# Patient Record
Sex: FEMALE | Race: WHITE | NOT HISPANIC OR LATINO | Employment: PART TIME | URBAN - METROPOLITAN AREA
[De-identification: names, ages, dates, MRNs, and addresses within clinical notes are randomized per-mention and may not be internally consistent; named-entity substitution may affect disease eponyms.]

---

## 2017-04-11 ENCOUNTER — TELEPHONE (OUTPATIENT)
Dept: ELECTROPHYSIOLOGY | Facility: CLINIC | Age: 32
End: 2017-04-11

## 2017-04-11 DIAGNOSIS — G90.A POTS (POSTURAL ORTHOSTATIC TACHYCARDIA SYNDROME): Primary | ICD-10-CM

## 2017-04-11 NOTE — TELEPHONE ENCOUNTER
----- Message from Kayy Hernandez sent at 4/11/2017  4:33 PM CDT -----  Contact: Pt called  Pt called, states her ankles are swelling and having slight chest pains occassionally. Ph for pt is [phone]. Thank you

## 2017-04-11 NOTE — TELEPHONE ENCOUNTER
"Patient states she is having a slight increase in swelling and mild chest pain over several weeks. Patient instructed to go to the ER. Pt does not feel her symptoms are "that bad" I offered pt an appointment tomorrow here in clinic. Pt will scheduled an appointment with her local cardiologist and keep the scheduled 6mo f/u appointment 04/17/2017 with NITA Rodriguez   "

## 2017-04-17 ENCOUNTER — HOSPITAL ENCOUNTER (OUTPATIENT)
Dept: CARDIOLOGY | Facility: CLINIC | Age: 32
Discharge: HOME | End: 2017-04-17
Payer: COMMERCIAL

## 2017-04-17 ENCOUNTER — TELEPHONE (OUTPATIENT)
Dept: ELECTROPHYSIOLOGY | Facility: CLINIC | Age: 32
End: 2017-04-17

## 2017-04-17 ENCOUNTER — OFFICE VISIT (OUTPATIENT)
Dept: ELECTROPHYSIOLOGY | Facility: CLINIC | Age: 32
End: 2017-04-17
Payer: COMMERCIAL

## 2017-04-17 VITALS
BODY MASS INDEX: 22.92 KG/M2 | SYSTOLIC BLOOD PRESSURE: 100 MMHG | DIASTOLIC BLOOD PRESSURE: 64 MMHG | WEIGHT: 137.56 LBS | HEART RATE: 61 BPM | HEIGHT: 65 IN

## 2017-04-17 DIAGNOSIS — R06.02 SOB (SHORTNESS OF BREATH): ICD-10-CM

## 2017-04-17 DIAGNOSIS — F41.9 ANXIETY: ICD-10-CM

## 2017-04-17 DIAGNOSIS — R07.9 CHEST PAIN, UNSPECIFIED TYPE: ICD-10-CM

## 2017-04-17 DIAGNOSIS — G90.A POTS (POSTURAL ORTHOSTATIC TACHYCARDIA SYNDROME): Primary | ICD-10-CM

## 2017-04-17 DIAGNOSIS — R42 DIZZINESS: ICD-10-CM

## 2017-04-17 DIAGNOSIS — G90.A POTS (POSTURAL ORTHOSTATIC TACHYCARDIA SYNDROME): ICD-10-CM

## 2017-04-17 DIAGNOSIS — R55 PRE-SYNCOPE: ICD-10-CM

## 2017-04-17 DIAGNOSIS — R53.83 FATIGUE, UNSPECIFIED TYPE: ICD-10-CM

## 2017-04-17 DIAGNOSIS — R00.2 HEART PALPITATIONS: ICD-10-CM

## 2017-04-17 DIAGNOSIS — R68.89 DECREASED ACTIVITY TOLERANCE: ICD-10-CM

## 2017-04-17 PROCEDURE — 99214 OFFICE O/P EST MOD 30 MIN: CPT | Mod: S$GLB,,, | Performed by: NURSE PRACTITIONER

## 2017-04-17 PROCEDURE — 93000 ELECTROCARDIOGRAM COMPLETE: CPT | Mod: S$GLB,,, | Performed by: INTERNAL MEDICINE

## 2017-04-17 PROCEDURE — 1160F RVW MEDS BY RX/DR IN RCRD: CPT | Mod: S$GLB,,, | Performed by: NURSE PRACTITIONER

## 2017-04-17 PROCEDURE — 99999 PR PBB SHADOW E&M-EST. PATIENT-LVL III: CPT | Mod: PBBFAC,,, | Performed by: NURSE PRACTITIONER

## 2017-04-17 NOTE — PROGRESS NOTES
Subjective:    Patient ID:  Wendy Rodriguez is a 32 y.o. female who presents for follow-up of POTS.     Ms. Rodriguez is a patient of Dr. Virginia Riley.     HPI     Ms. Rodriguez is a 32 y.o. female with POTS, dizziness, fatigue, palpitations, and anxiety. It her initial office visit, Ms. Rodriguez reported that she began to experience daily palpitations with standing with associated dizziness following her , appendectomy and cholecystectomy. At the time, she noted that her HR would frequently jump from 70s to 150 bpm without a specific trigger. At the time, she denied experiencing an episode of shaina syncope but reported frequent episodes of pre-syncope. Ms. Rodriguez was diagnosed with inappropriate ST at that time ([facility]; Dr. Feliz) and placed on metoprolol. She did well for 2 years with a stable dose of metoprolol but ultimately developed a recurrence of her symptoms; at the time, these were occurring daily, consisting of dizziness, palpitations and fatigue.    She sought medical attention with Dr. Feliz and underwent further cardiovascular testing that included an Echo (normal LVEF, trivial TR and PASP 33 mm hg)  A exercise nuclear stress test (~[id number]) was negative at the time for ischemia however it was noted that her HR suddenly elvira from 70 bpm to 140 bpm when standing from a seated position. As a result of this observation she was diagnosed with POTS and her BB was increased.    With no significant resolution in symptoms, she sought care in [place]; she presented to the ED, and her medications were changed to Propanalol and Midodrine. She did report an initial improvement in her symptoms but not complete resolution. She ultimately presented to [facility] for a second opinion. At her initial office visit, Ms. Rodriguez reported experiencing fragmented sleep at night (4-5 hours a night of sleep; not restful). 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 Likewise at  home she stated that she had difficulty performing household chores, noting that she was quickly fatigued.     Since her last office visit, Ms. Rodriguez reports experiencing occasional BLE edema in the evening over the last 2 weeks; as well as atypical CP (sometimes located on the left, sometimes in the center of her chest); lasting from minutes to hours  with associated increased SOB (she has had to leave work on one occasion and has had to stop activities and rest over the last few weeks on multiple occasions due to the CP and SOB). She also notes increased headaches as well over the last 2 months. She has not had a syncopal episode since her last office visit but reports occasional pre-syncope. She has been taking betaxolol 5 mg PO QAM and 5 in the evening PRN.     I reviewed today's ECG which demonstrated NSR at 61 bpm; , QRS 84, and QTc 380.    Review of Systems   Constitution: Positive for malaise/fatigue. Negative for diaphoresis and weakness.   HENT: Negative for headaches and nosebleeds.    Eyes: Negative for double vision.   Cardiovascular: Positive for chest pain, dyspnea on exertion, leg swelling, near-syncope and palpitations. Negative for irregular heartbeat and syncope.   Respiratory: Positive for shortness of breath.    Skin: Negative.    Musculoskeletal: Negative.    Gastrointestinal: Negative for abdominal pain, hematemesis and hematochezia.   Genitourinary: Negative for hematuria.   Neurological: Positive for dizziness and light-headedness.   Psychiatric/Behavioral: Negative for altered mental status.        Objective:    Physical Exam   Constitutional: She is oriented to person, place, and time. She appears well-developed and well-nourished.   HENT:   Head: Normocephalic and atraumatic.   Eyes: Pupils are equal, round, and reactive to light.   Neck: Normal range of motion.   Cardiovascular: Normal rate, regular rhythm, normal heart sounds and intact distal pulses.    Pulmonary/Chest: Effort  normal and breath sounds normal.   Abdominal: Soft.   Musculoskeletal: Normal range of motion.   Neurological: She is alert and oriented to person, place, and time.   Skin: She is not diaphoretic.   Vitals reviewed.        Assessment:       1. POTS (postural orthostatic tachycardia syndrome)    2. Dizziness    3. Fatigue, unspecified type    4. Heart palpitations    5. Anxiety         Plan:       In summary, Ms. Rodriguez is a 32 y.o. female with POTS, dizziness, fatigue, palpitations, and anxiety. Ms. Rodriguez has not experienced a syncopal episode, but continues to experience pre-syncope and dizziness, as well as new CP and SOB; all of which limit her activity.    PET Stress Test to further evaluate CP and SOB. (She is unable to safely run on a treadmill given her frequent dizziness and occasional episodes of presyncope; low suspicion for CAD; would prefer treadmill stress, but she reports that she cannot safely complete this). Negative stress 2-3 years ago.   For now continue current medication regimen. Will discuss BLE edema and COVARRUBIAS w/Dr. Virginia Riley.   Follow up in EP clinic in 3 months, sooner as needed.     Esha Rodriguez, MN, APRN, FNP-C     Discussed with Dr. Alfaro who agrees with the aforementioned plan).   (A copy of today's note was sent to Dr. Virginia Riley).

## 2017-04-19 ENCOUNTER — CLINICAL SUPPORT (OUTPATIENT)
Dept: CARDIOLOGY | Facility: CLINIC | Age: 32
End: 2017-04-19
Payer: COMMERCIAL

## 2017-04-19 PROCEDURE — 78492 MYOCRD IMG PET MLT RST&STRS: CPT | Mod: S$GLB,,, | Performed by: INTERNAL MEDICINE

## 2017-04-19 PROCEDURE — A9555 RB82 RUBIDIUM: HCPCS | Mod: S$GLB,,, | Performed by: INTERNAL MEDICINE

## 2017-04-19 PROCEDURE — 93015 CV STRESS TEST SUPVJ I&R: CPT | Mod: S$GLB,,, | Performed by: INTERNAL MEDICINE

## 2017-05-04 ENCOUNTER — TELEPHONE (OUTPATIENT)
Dept: ELECTROPHYSIOLOGY | Facility: CLINIC | Age: 32
End: 2017-05-04

## 2017-05-04 NOTE — TELEPHONE ENCOUNTER
Pt returned call. Asking about cont'd swelling in ankles, and if this in normal occurrence with her illness. Advised pt that venous pooling is normal, and that is why she is rec'd to wear compression hose and lower body training. Pt verbalized understanding and said she will try different stockings and see if they improve swelling any.

## 2017-05-04 NOTE — TELEPHONE ENCOUNTER
----- Message from Kayy Hernandez sent at 5/3/2017  1:19 PM CDT -----  Contact: pt called  Pt called, states a beta blocker was discontinued and states her ankles are still swelling. Ph for pt is [phone]. Thank you

## 2017-06-05 DIAGNOSIS — G90.A POTS (POSTURAL ORTHOSTATIC TACHYCARDIA SYNDROME): Primary | ICD-10-CM

## 2017-06-05 RX ORDER — PROPRANOLOL HYDROCHLORIDE 40 MG/1
40 TABLET ORAL
Qty: 90 TABLET | Refills: 3 | Status: SHIPPED | OUTPATIENT
Start: 2017-06-05 | End: 2018-06-05

## 2017-06-05 NOTE — TELEPHONE ENCOUNTER
----- Message from Benton Hayden MD sent at 6/5/2017  4:23 PM CDT -----  Contact: Pt calledd  Not a great way to do things -- she can take inderal short acting - 40 mg a pop  ----- Message -----  From: Deanne Mcclellan RN  Sent: 6/5/2017   2:16 PM  To: Benton Hayden MD    Pt stopped beta blockers a few months ago. She is now calling to see if there is a medication she can take when she is experiencing her episodes of fast heart rates (at that time). She says they only happen around every 2-3 weeks. She doesn't really want to take medication every day if not necessary.   Please advise  ----- Message -----  From: Kayy Hernandez  Sent: 6/5/2017  12:34 PM  To: Deanne Mcclellan RN    Pt called, and is wondering if she may speak with you in regards to her episode. She states her beta blocker was discontinued. Ph for pt is [phone]. Thank you

## 2017-08-17 ENCOUNTER — OFFICE VISIT (OUTPATIENT)
Dept: ELECTROPHYSIOLOGY | Facility: CLINIC | Age: 32
End: 2017-08-17
Payer: COMMERCIAL

## 2017-08-17 ENCOUNTER — HOSPITAL ENCOUNTER (OUTPATIENT)
Dept: CARDIOLOGY | Facility: CLINIC | Age: 32
Discharge: HOME | End: 2017-08-17
Payer: COMMERCIAL

## 2017-08-17 VITALS
BODY MASS INDEX: 22.81 KG/M2 | SYSTOLIC BLOOD PRESSURE: 111 MMHG | DIASTOLIC BLOOD PRESSURE: 68 MMHG | HEIGHT: 65 IN | HEART RATE: 73 BPM | WEIGHT: 136.88 LBS

## 2017-08-17 DIAGNOSIS — R00.2 HEART PALPITATIONS: ICD-10-CM

## 2017-08-17 DIAGNOSIS — R42 DIZZINESS: Primary | ICD-10-CM

## 2017-08-17 DIAGNOSIS — G90.A POTS (POSTURAL ORTHOSTATIC TACHYCARDIA SYNDROME): ICD-10-CM

## 2017-08-17 PROCEDURE — 93000 ELECTROCARDIOGRAM COMPLETE: CPT | Mod: S$GLB,,, | Performed by: INTERNAL MEDICINE

## 2017-08-17 PROCEDURE — 99999 PR PBB SHADOW E&M-EST. PATIENT-LVL III: CPT | Mod: PBBFAC,,, | Performed by: INTERNAL MEDICINE

## 2017-08-17 PROCEDURE — 99215 OFFICE O/P EST HI 40 MIN: CPT | Mod: S$GLB,,, | Performed by: INTERNAL MEDICINE

## 2017-08-17 PROCEDURE — 3008F BODY MASS INDEX DOCD: CPT | Mod: S$GLB,,, | Performed by: INTERNAL MEDICINE

## 2017-08-17 RX ORDER — HYOSCYAMINE SULFATE 0.38 MG/1
0.38 TABLET, EXTENDED RELEASE ORAL EVERY MORNING
Qty: 30 TABLET | Refills: 11 | Status: SHIPPED | OUTPATIENT
Start: 2017-08-17 | End: 2020-06-22

## 2017-08-18 DIAGNOSIS — G90.A POTS (POSTURAL ORTHOSTATIC TACHYCARDIA SYNDROME): Primary | ICD-10-CM

## 2018-11-07 DIAGNOSIS — G90.A POTS (POSTURAL ORTHOSTATIC TACHYCARDIA SYNDROME): Primary | ICD-10-CM

## 2020-06-18 ENCOUNTER — TELEPHONE (OUTPATIENT)
Dept: CARDIOLOGY | Facility: CLINIC | Age: 35
End: 2020-06-18

## 2020-06-18 NOTE — TELEPHONE ENCOUNTER
"Returned call and confirmed need to activate "my [facility]/my chart "page/portal and will confirm appointment for Monday at 4pm    ----- Message from Dagmar Garner RN sent at 6/18/2020  6:01 PM CDT -----    ----- Message -----  From: Gale Ac  Sent: 6/18/2020   3:46 PM CDT  To: Jackelyn WOO Staff    patient needs call back regarding being scheduled, having flareup...[phone] (home)         "

## 2020-06-22 ENCOUNTER — OFFICE VISIT (OUTPATIENT)
Dept: CARDIOLOGY | Facility: CLINIC | Age: 35
End: 2020-06-22

## 2020-06-22 DIAGNOSIS — R42 DIZZINESS: ICD-10-CM

## 2020-06-22 DIAGNOSIS — R00.2 HEART PALPITATIONS: Primary | ICD-10-CM

## 2020-06-22 DIAGNOSIS — G90.A POTS (POSTURAL ORTHOSTATIC TACHYCARDIA SYNDROME): ICD-10-CM

## 2020-06-22 PROCEDURE — 99205 OFFICE O/P NEW HI 60 MIN: CPT | Mod: 95,,, | Performed by: INTERNAL MEDICINE

## 2020-06-22 PROCEDURE — 99205 PR OFFICE/OUTPT VISIT, NEW, LEVL V, 60-74 MIN: ICD-10-PCS | Mod: 95,,, | Performed by: INTERNAL MEDICINE

## 2020-06-22 RX ORDER — HYOSCYAMINE SULFATE 0.38 MG/1
0.38 TABLET, EXTENDED RELEASE ORAL EVERY MORNING
Qty: 30 TABLET | Refills: 11 | Status: SHIPPED | OUTPATIENT
Start: 2020-06-22 | End: 2021-07-02 | Stop reason: SDUPTHER

## 2020-06-22 RX ORDER — BETAXOLOL 10 MG/1
10 TABLET, FILM COATED ORAL DAILY
Qty: 90 TABLET | Refills: 3 | Status: SHIPPED | OUTPATIENT
Start: 2020-06-22 | End: 2021-06-22

## 2020-06-22 NOTE — PROGRESS NOTES
Subjective:   Patient ID:  Wendy Rodriguez is a 35 y.o. female     The patient location is: Work/car  The chief complaint leading to consultation is: POTS    Visit type: audiovisual    Face to Face time with patient: 20  30 minutes of total time spent on the encounter, which includes face to face time and non-face to face time preparing to see the patient (eg, review of tests), Obtaining and/or reviewing separately obtained history, Documenting clinical information in the electronic or other health record, Independently interpreting results (not separately reported) and communicating results to the patient/family/caregiver, or Care coordination (not separately reported).     Each patient to whom he or she provides medical services by telemedicine is:  (1) informed of the relationship between the physician and patient and the respective role of any other health care provider with respect to management of the patient; and (2) notified that he or she may decline to receive medical services by telemedicine and may withdraw from such care at any time.      HPI  Background   34 yo female seen today for POTS      Approximately 4 years ago after a , appendectomy and cholecystectomy she began to experience frequently almost daily palpitations while standing and associated dizziness. She frequently noted that her HR would jump from 70s to 150 bpm without a specific trigger. She denies experiencing a shaina episode of syncope but reports presyncope often.      She was diagnosed with inappropriate sinus tachycardia at that time in [facility] by Dr. Feliz and placed on metoprolol. Her cardiovascular work up at that time consisted of a Holter monitor with the aforementioned findings.    She did well for 2 years with a stable dose of metoprolol but 8 weeks ago noted worsening symptoms. At that time her symptoms were daily and consisted of dizziness, palpitations and fatigue daily.    She sought medical attention with   [facility] and underwent further cardiovascular testing that included an Echo (normal LVEF, trivial TR and PASP 33 mm hg)  A exercise nuclear stress test was negative for ischemia however it was noted that her HR suddenly elvira from 70 bpm to 140 bpm when standing from a seated position. As a result of this observation she was diagnosed with POTS and her BB was increased.    With no significant resolution in symptoms, a month ago she decided to seek care in [facility] at which time her medications were changed to Propanalol and Midodrine. She does report an improvement in symptoms with this change but not resolution. She is here to see us for a second opinion.    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 Likewise at home she finds it difficult to perform household chores and finds that she is quickly fatigued.      Consumes low protein diet by history .  Drinks 2 x 32 oz cups of fluids a day intentionally    Denies smoking, ETOH, or IV drug use       ECG shows NSR with competing ectopic atrial rhythm.      Update since initial visit::  On that visit, by our assessment she had the right substrate for POTS given her age, body habitus and a likely inciting event that resulted in severe deconditioning.    She was at the time uninsured but was awaiting insurance approval in January 1st. At that time we asked her to proceed with lower body training, high protein diet, and advised better sleep hygiene and added melatonin 5 hours prior to sleep.    When she obtains her insurance we would then obtain a 48 hour Holter monitor and Tilt table testing ( off her current meds )  We also started treating her with Betaxolol 10 mg for now, continue midodrine until the transition is made - we advised a trial off midodrine once on Betaxolol.    Advised to stay hydrated.      Holter done on  1/4/16:  >>  1. For the 1st 24 hour period, the circadian pattern of sinus rate variability followed a trimodal curve with HRs averaging 135 bpm during early waking hours, 75 bpm during waking hours, 57 bpm during sleep (8:45 PM - 6:16 AM) and 90 bpm during post   awakening hours.   2. For the 2nd 24 hour period, the circadian pattern of sinus rate variability followed a trimodal curve with HRs averaging 150 bpm during early waking hours, 75 bpm during waking hours, 55 bpm during sleep (9:10 PM - 6:55 AM) and 90 bpm during post   awakening hours.   3. Many palps -- usually associated with SR at times ST   4. HRV was moderate to good and TI was 15.  HUT was done on 1/11/2016  >>  1.  Normal arterial baroreceptor reflex arch function.  2.  Excessive venous pooling resulting in symptomatic complaints during tilting.  3.  Effective use of Ace wraps to decrease venous pooling and dk symptoms during tilting.  4.  Evidence of significant beta hyperadrenergia with inducibility of a transient vasodepressor reaction during tilting with Isuprel.  Induction of vasodepression with Isuprel carries a certain number of false positives.  Clinical correlation is   recommended.  5.  The baseline tilt study suggests hypervagotonia.  Overall, there is likely excessive venous pooling, beta hyperadrenergic syndrome as well as some C-fiber hypersensitivity.   The above studies were done off Rx  We then advised her to start on hyoscymine, BBs and she was taken off the midodrine. She is doing her exercises only occasionally.  She is much better but has a few Sx and they are much rare and less severe. More sx around her menses. She has some gray outs. (4)  She has a little dependent leg edema at night at times. palps are a lot  less. Once or twice a week (She had c/o >60 times on Holter). She still stays sleepy a lot although she sleeps 9:30 to 5:15. She still needs the alarm to wake up.       Update since 4/17/17:  We agreed on the  following  Long talk -- mostly education  Add 30 min to sleep allotment >. Has done so and now beats the alarm  Daily lower body exercises   Truform - knee Highs     She called later and c/o leg swelling -- she was taken off her Kerlone and Levsin and this improved but she has a few more dizzy feelings.   She goes to the gym a few times  aweek and she uses a recumbent bike etc. She is also doing MM's exercises. She is taking Inderal PRN. Her Sx are tolerable.      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  She has also stopped doing her lower body training exercises for a long time.  She has resumed the recently about a week or 2 ago.  Her sleep is now restful and she gets about 7-8 hours of sleep waking up feeling rested.    Current Outpatient Medications   Medication Sig    betaxoloL (KERLONE) 10 mg Tab Take 1 tablet (10 mg total) by mouth once daily.    hyoscyamine (LEVBID) 0.375 mg Tb12 Take 1 tablet (0.375 mg total) by mouth every morning.    propranolol (INDERAL) 40 MG tablet Take 1 tablet (40 mg total) by mouth as needed.     No current facility-administered medications for this visit.      ROS  See HPI, otherwise, negative CV ROS including lack of palpitations, chest pain, dyspnea on exertion, shortness of breath, orthopnea, PND, leg edema, syncope, near-syncope, symptoms of TIA and or peripheral emboli and claudication.    Objective:   Physical Exam  Appears to be in NAD,   Color is WNL (no pallor, no facial rashes), no obvious NVC. No obvious neck masses.   No hoarseness and no facial distortions.    No tachypnea.  No labored breathing.  Mood, affect, comprehension and speech are all WNL.   Patient denies leg edema.       Assessment:      1. Heart palpitations    2. POTS (postural orthostatic tachycardia  syndrome)    3. Dizziness        Plan:    We discussed the again appropriate measures to take to avoid vasodepressor symptoms.  In summary, we will restart her on betaxolol 2.5 mg initially to increase to 10 mg as tolerated/needed.  In addition, we will restart levsin at 0.375 mg p.o. b.i.d..  Initially so she will start with 1 tablet a day and adjust as required.  No orders of the defined types were placed in this encounter.    Follow up if symptoms worsen or fail to improve.  Medications Discontinued During This Encounter   Medication Reason    hyoscyamine (LEVBID) 0.375 mg Tb12      Medications Ordered This Encounter   Medications    betaxoloL (KERLONE) 10 mg Tab     Sig: Take 1 tablet (10 mg total) by mouth once daily.     Dispense:  90 tablet     Refill:  3    hyoscyamine (LEVBID) 0.375 mg Tb12     Sig: Take 1 tablet (0.375 mg total) by mouth every morning.     Dispense:  30 tablet     Refill:  11     Medication List with Changes/Refills   New Medications    BETAXOLOL (KERLONE) 10 MG TAB    Take 1 tablet (10 mg total) by mouth once daily.   Current Medications    PROPRANOLOL (INDERAL) 40 MG TABLET    Take 1 tablet (40 mg total) by mouth as needed.   Changed and/or Refilled Medications    Modified Medication Previous Medication    HYOSCYAMINE (LEVBID) 0.375 MG TB12 hyoscyamine (LEVBID) 0.375 mg Tb12       Take 1 tablet (0.375 mg total) by mouth every morning.    Take 1 tablet (0.375 mg total) by mouth every morning.

## 2021-02-03 NOTE — PROGRESS NOTES
Subjective:   Patient ID:  Wendy Rodriguez is a 32 y.o. female     Chief complaint:POTS      HPI  Background   31 yo female seen today for POTS      Approximately 4 years ago after a , appendectomy and cholecystectomy she began to experience frequently almost daily palpitations while standing and associated dizziness. She frequently noted that her HR would jump from 70s to 150 bpm without a specific trigger. She denies experiencing a shaina episode of syncope but reports presyncope often.      She was diagnosed with inappropriate sinus tachycardia at that time in [facility] by Dr. Feilz and placed on metoprolol. Her cardiovascular work up at that time consisted of a Holter monitor with the aforementioned findings.    She did well for 2 years with a stable dose of metoprolol but 8 weeks ago noted worsening symptoms. At that time her symptoms were daily and consisted of dizziness, palpitations and fatigue daily.    She sought medical attention with Dr. Feliz and underwent further cardiovascular testing that included an Echo (normal LVEF, trivial TR and PASP 33 mm hg)  A exercise nuclear stress test was negative for ischemia however it was noted that her HR suddenly elvira from 70 bpm to 140 bpm when standing from a seated position. As a result of this observation she was diagnosed with POTS and her BB was increased.    With no significant resolution in symptoms, a month ago she decided to seek care in [facility] at which time her medications were changed to Propanalol and Midodrine. She does report an improvement in symptoms with this change but not resolution. She is here to see us for a second opinion.    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 Likewise at home she finds it difficult to perform household chores and finds that she is quickly fatigued.       Consumes low protein diet by history .  Drinks 2 x 32 oz cups of fluids a day intentionally    Denies smoking, ETOH, or IV drug use       ECG shows NSR with competing ectopic atrial rhythm.      Update since initial visit::  On that visit, by our assessment she had the right substrate for POTS given her age, body habitus and a likely inciting event that resulted in severe deconditioning.    She was at the time uninsured but was awaiting insurance approval in January 1st. At that time we asked her to proceed with lower body training, high protein diet, and advised better sleep hygiene and added melatonin 5 hours prior to sleep.    When she obtains her insurance we would then obtain a 48 hour Holter monitor and Tilt table testing ( off her current meds )  We also started treating her with Betaxolol 10 mg for now, continue midodrine until the transition is made - we advised a trial off midodrine once on Betaxolol.    Advised to stay hydrated.      Holter done on 1/4/16:  >>  1. For the 1st 24 hour period, the circadian pattern of sinus rate variability followed a trimodal curve with HRs averaging 135 bpm during early waking hours, 75 bpm during waking hours, 57 bpm during sleep (8:45 PM - 6:16 AM) and 90 bpm during post   awakening hours.   2. For the 2nd 24 hour period, the circadian pattern of sinus rate variability followed a trimodal curve with HRs averaging 150 bpm during early waking hours, 75 bpm during waking hours, 55 bpm during sleep (9:10 PM - 6:55 AM) and 90 bpm during post   awakening hours.   3. Many palps -- usually associated with SR at times ST   4. HRV was moderate to good and TI was 15.  HUT was done on 1/11/2016  >>  1.  Normal arterial baroreceptor reflex arch function.  2.  Excessive venous pooling resulting in symptomatic complaints during tilting.  3.  Effective use of Ace wraps to decrease venous pooling and dk symptoms during tilting.  4.  Evidence of significant beta hyperadrenergia  23 with inducibility of a transient vasodepressor reaction during tilting with Isuprel.  Induction of vasodepression with Isuprel carries a certain number of false positives.  Clinical correlation is   recommended.  5.  The baseline tilt study suggests hypervagotonia.  Overall, there is likely excessive venous pooling, beta hyperadrenergic syndrome as well as some C-fiber hypersensitivity.   The above studies were done off Rx  We then advised her to start on hyoscymine, BBs and she was taken off the midodrine. She is doing her exercises only occasionally.  She is much better but has a few Sx and they are much rare and less severe. More sx around her menses. She has some gray outs. (4)  She has a little dependent leg edema at night at times. palps are a lot  less. Once or twice a week (She had c/o >60 times on Holter). She still stays sleepy a lot although she sleeps 9:30 to 5:15. She still needs the alarm to wake up.      Update since 4/17/17:  We agreed on the following  Long talk -- mostly education  Add 30 min to sleep allotment >. Has done so and now beats the alarm  Daily lower body exercises   Truform - knee His    She called later and c/o leg swelling -- she was taken off her Kerlone and Levsin and this improved but she has a few more dizzy feelings.   She goes to the gym a few times  aweek and she uses a recumbent bike etc. She is also doing MM's exercises. She is taking Inderal PRN. Her Sx are tolerable.    Current Outpatient Prescriptions   Medication Sig    propranolol (INDERAL) 40 MG tablet Take 1 tablet (40 mg total) by mouth as needed.    hyoscyamine (LEVBID) 0.375 mg Tb12 Take 1 tablet (0.375 mg total) by mouth every morning.     No current facility-administered medications for this visit.      Review of Systems   Constitution: Positive for malaise/fatigue. Negative for decreased appetite, weakness, weight gain and weight loss.        When heart races   HENT: Negative for headaches and nosebleeds.     Eyes: Negative for blurred vision and visual disturbance.   Cardiovascular: Positive for chest pain, leg swelling, near-syncope and palpitations. Negative for claudication, cyanosis, dyspnea on exertion, irregular heartbeat, orthopnea, paroxysmal nocturnal dyspnea and syncope.   Respiratory: Positive for shortness of breath. Negative for cough and wheezing.         With chest pain   Endocrine: Negative for heat intolerance.   Skin: Negative for rash.   Musculoskeletal: Negative for muscle weakness and myalgias.   Gastrointestinal: Negative for abdominal pain, anorexia, melena, nausea and vomiting.   Genitourinary: Negative for menorrhagia.   Neurological: Positive for dizziness, light-headedness and loss of balance. Negative for excessive daytime sleepiness, seizures and vertigo.   Psychiatric/Behavioral: Negative for altered mental status and depression. The patient is not nervous/anxious.        Objective:   Physical Exam   Constitutional: She is oriented to person, place, and time. She appears well-developed and well-nourished.   HENT:   Head: Normocephalic and atraumatic.   Right Ear: External ear normal.   Left Ear: External ear normal.   Neck: Normal range of motion. Neck supple. No thyromegaly present.   Cardiovascular: Normal rate, regular rhythm, normal heart sounds and intact distal pulses.  Exam reveals no gallop, no S3, no S4, no friction rub, no midsystolic click and no opening snap.    No murmur heard.  Pulses:       Carotid pulses are 2+ on the right side, and 2+ on the left side.       Radial pulses are 2+ on the right side, and 2+ on the left side.        Dorsalis pedis pulses are 2+ on the right side, and 2+ on the left side.        Posterior tibial pulses are 2+ on the right side, and 2+ on the left side.   Pulmonary/Chest: Effort normal and breath sounds normal.   Abdominal: Soft. She exhibits no distension. There is no tenderness.   Musculoskeletal:        Right ankle: She exhibits no  "swelling.        Left ankle: She exhibits no swelling.        Right lower leg: She exhibits no swelling.        Left lower leg: She exhibits no swelling.   Neurological: She is alert and oriented to person, place, and time. She has normal strength. No cranial nerve deficit. Gait normal.   Skin: Skin is warm. No rash noted. No cyanosis. Nails show no clubbing.   Psychiatric: She has a normal mood and affect. Her speech is normal and behavior is normal. Thought content normal. Cognition and memory are normal.   Nursing note and vitals reviewed.    /68   Pulse 73   Ht 5' 5" (1.651 m)   Wt 62.1 kg (136 lb 14.5 oz)   BMI 22.78 kg/m²      Assessment:    Doing quite well  1. Dizziness    2. POTS (postural orthostatic tachycardia syndrome)    3. Heart palpitations        Plan:    Will restart levsin  No orders of the defined types were placed in this encounter.    Return in about 1 year (around 8/17/2018), or if symptoms worsen or fail to improve.  Medications Discontinued During This Encounter   Medication Reason    betaxolol (KERLONE) 10 mg Tab Patient no longer taking    escitalopram oxalate (LEXAPRO) 5 MG Tab Patient no longer taking    hyoscyamine (LEVBID) 0.375 mg Tb12 Patient no longer taking   Inderal PRN -- once Q 2 weeks or so.   New Prescriptions    No medications on file     Modified Medications    Modified Medication Previous Medication    HYOSCYAMINE (LEVBID) 0.375 MG TB12 hyoscyamine (LEVBID) 0.375 mg Tb12       Take 1 tablet (0.375 mg total) by mouth every morning.    Take 1 tablet (0.375 mg total) by mouth every morning.              "

## 2021-07-02 DIAGNOSIS — G90.A POTS (POSTURAL ORTHOSTATIC TACHYCARDIA SYNDROME): ICD-10-CM

## 2021-07-02 RX ORDER — HYOSCYAMINE SULFATE 0.38 MG/1
0.38 TABLET, EXTENDED RELEASE ORAL EVERY MORNING
Qty: 30 TABLET | Refills: 11 | Status: SHIPPED | OUTPATIENT
Start: 2021-07-02 | End: 2022-08-11 | Stop reason: SDUPTHER